# Patient Record
Sex: MALE | Race: WHITE | Employment: FULL TIME | URBAN - METROPOLITAN AREA
[De-identification: names, ages, dates, MRNs, and addresses within clinical notes are randomized per-mention and may not be internally consistent; named-entity substitution may affect disease eponyms.]

---

## 2017-03-30 PROBLEM — J30.1 SEASONAL ALLERGIC RHINITIS DUE TO POLLEN: Status: ACTIVE | Noted: 2017-03-30

## 2017-03-30 PROBLEM — IMO0001 ASYMMETRICAL SENSORINEURAL HEARING LOSS OF BOTH EARS: Status: ACTIVE | Noted: 2017-03-30

## 2017-03-30 PROBLEM — H93.13 BILATERAL TINNITUS: Status: ACTIVE | Noted: 2017-03-30

## 2018-12-18 ENCOUNTER — HOSPITAL ENCOUNTER (OUTPATIENT)
Age: 58
Discharge: HOME | End: 2018-12-18
Attending: FAMILY MEDICINE
Payer: COMMERCIAL

## 2018-12-18 VITALS
DIASTOLIC BLOOD PRESSURE: 68 MMHG | HEART RATE: 65 BPM | OXYGEN SATURATION: 100 % | SYSTOLIC BLOOD PRESSURE: 134 MMHG | HEIGHT: 69 IN | TEMPERATURE: 97 F | RESPIRATION RATE: 20 BRPM | BODY MASS INDEX: 24.44 KG/M2 | WEIGHT: 165 LBS

## 2018-12-18 DIAGNOSIS — H61.23 BILATERAL IMPACTED CERUMEN: Primary | ICD-10-CM

## 2018-12-18 PROCEDURE — 69209 REMOVE IMPACTED EAR WAX UNI: CPT

## 2018-12-18 PROCEDURE — 99213 OFFICE O/P EST LOW 20 MIN: CPT

## 2018-12-18 NOTE — ED PROVIDER NOTES
Patient Seen in: [facility]    History   Patient presents with:  Ear Problem Pain (neurosensory)  Headache (neurologic)    Stated Complaint: bilateral ears clogged/headache    HPI    41-year-old male presents for bilateral clogg oriented to person, place, and time. He appears well-developed and well-nourished. HENT:   Head: Normocephalic and atraumatic.    Right Ear: Hearing and external ear normal.   Left Ear: Hearing and external ear normal.   Nose: Nose normal.   Mouth/Throat:

## 2018-12-18 NOTE — ED INITIAL ASSESSMENT (HPI)
Patient presents with complains of \" clogged ears\" x 2 weeks with a intermittent headache x 2 days. Denies any fall or hitting his head. Denies any nausea, vomiting, or visual problems.

## 2019-11-11 ENCOUNTER — OFFICE VISIT (OUTPATIENT)
Dept: PODIATRY CLINIC | Facility: CLINIC | Age: 59
End: 2019-11-11
Payer: COMMERCIAL

## 2019-11-11 VITALS — DIASTOLIC BLOOD PRESSURE: 74 MMHG | SYSTOLIC BLOOD PRESSURE: 132 MMHG | HEART RATE: 65 BPM

## 2019-11-11 DIAGNOSIS — L60.0 ONYCHOCRYPTOSIS: Primary | ICD-10-CM

## 2019-11-11 DIAGNOSIS — M20.5X9 HALLUX LIMITUS, UNSPECIFIED LATERALITY: ICD-10-CM

## 2019-11-11 PROCEDURE — 99203 OFFICE O/P NEW LOW 30 MIN: CPT | Performed by: PODIATRIST

## 2019-11-11 PROCEDURE — 11750 EXCISION NAIL&NAIL MATRIX: CPT | Performed by: PODIATRIST

## 2019-11-11 RX ORDER — CEPHALEXIN 500 MG/1
500 CAPSULE ORAL 3 TIMES DAILY
Qty: 30 CAPSULE | Refills: 0 | Status: SHIPPED | OUTPATIENT
Start: 2019-11-11 | End: 2020-02-17 | Stop reason: ALTCHOICE

## 2019-11-12 NOTE — PATIENT INSTRUCTIONS
Ingrown Toenail (Excised)  An ingrown toenail occurs when the nail grows sideways into the skin next to the nail. This can cause pain and may lead to an infection with redness, swelling, and sometimes drainage.   The most common cause of an ingrown toenail nail bed to become dry and for the swelling to go down. If only the side of the nail was removed, it will begin to grow back in a few months.  To prevent recurrence, sometimes the side of the nail bed may be treated with a strong chemical to prevent the na that side of the nail to help it grow out straight. Follow-up care  Follow up as advised by your healthcare provider. If the ingrown toenail recurs, follow up with a foot specialist (podiatrist) for nail bed ablation.   When to seek medical care  Call your includes:  · Frequent warm water soaks  · Keeping the nail clean  · Wearing loose, comfortable shoes or open toe sandals  Another method to help the toe heal is to use a small piece of cotton or waxed dental floss to gently lift the corner of the problem n medicine to use. Note: Talk with your healthcare provider before using these medicines if you have chronic liver or kidney disease, have ever had a stomach ulcer or GI (gastrointestinal) bleeding, or are taking blood thinner medicines.   · If you were given

## 2019-11-12 NOTE — PROGRESS NOTES
Robyn Alicia is a 63year old male. Patient presents with:  New Patient: Left foot hallux, ingrown. 8/10, patient denies any discharge. Pain started last thursday.         HPI:     Patient presents today bothered by an ingrown toenail as well as pain unde file      Years of education: Not on file      Highest education level: Not on file    Tobacco Use      Smoking status: Never Smoker      Smokeless tobacco: Never Used    Substance and Sexual Activity      Alcohol use: No      Drug use: No    Other Topics the hallux. Today we decided to address the pain from the hallux. I discussed various treatment options decided to do an in office procedure for permanent removal of the nail.    Phenol and alcohol procedure this was discussed with the patient in great de

## 2019-11-12 NOTE — PROGRESS NOTES
Permanent removal of medial border of the left foot hallux with chemical destruction of nail root. Patient had left office before I could obtain post injection vitals.

## 2019-11-22 ENCOUNTER — HOSPITAL ENCOUNTER (OUTPATIENT)
Age: 59
Discharge: HOME | End: 2019-11-22
Payer: COMMERCIAL

## 2019-11-22 VITALS
TEMPERATURE: 98 F | DIASTOLIC BLOOD PRESSURE: 74 MMHG | HEART RATE: 61 BPM | WEIGHT: 165 LBS | RESPIRATION RATE: 16 BRPM | OXYGEN SATURATION: 100 % | BODY MASS INDEX: 24 KG/M2 | SYSTOLIC BLOOD PRESSURE: 111 MMHG

## 2019-11-22 DIAGNOSIS — H61.891 IRRITATION OF EXTERNAL EAR CANAL, RIGHT: ICD-10-CM

## 2019-11-22 DIAGNOSIS — H61.23 BILATERAL IMPACTED CERUMEN: Primary | ICD-10-CM

## 2019-11-22 PROCEDURE — 99213 OFFICE O/P EST LOW 20 MIN: CPT

## 2019-11-22 PROCEDURE — 99214 OFFICE O/P EST MOD 30 MIN: CPT

## 2019-11-22 PROCEDURE — 69209 REMOVE IMPACTED EAR WAX UNI: CPT

## 2019-11-22 RX ORDER — NEOMYCIN SULFATE, POLYMYXIN B SULFATE, HYDROCORTISONE 3.5; 10000; 1 MG/ML; [USP'U]/ML; MG/ML
3 SOLUTION/ DROPS AURICULAR (OTIC) 3 TIMES DAILY
Qty: 1 BOTTLE | Refills: 0 | Status: SHIPPED | OUTPATIENT
Start: 2019-11-22 | End: 2019-12-02

## 2019-11-22 NOTE — ED PROVIDER NOTES
Patient Seen in: [facility] In [address] [place] [address]      History   Patient presents with:  Ear Problem: clogged    Stated Complaint: BILATERAL EAR PAIN X 1 MONTH    HPI    70-year-old male here with complaint of bilateral ear occlusion for the past mon O2 Device None (Room air)       Current:/74   Pulse 61   Temp 98.2 °F (36.8 °C) (Temporal)   Resp 16   Wt 74.8 kg   SpO2 100%   BMI 24.37 kg/m²         Physical Exam  Vitals signs and nursing note reviewed.    Constitutional:       Appearance: He is impacted cerumen  (primary encounter diagnosis)  Irritation of external ear canal, right    Disposition:  Discharge  11/22/2019  5:44 pm    Follow-up:  Rosalina Garcia MD  [id number] [facility] [phone]          [facility]

## 2019-11-22 NOTE — ED INITIAL ASSESSMENT (HPI)
Pt. With ears feeling clogged for about 1 month. Lt. Is worse than Rt. No recent airplane travel. No recent swimming.

## 2019-11-25 ENCOUNTER — OFFICE VISIT (OUTPATIENT)
Dept: PODIATRY CLINIC | Facility: CLINIC | Age: 59
End: 2019-11-25
Payer: COMMERCIAL

## 2019-11-25 DIAGNOSIS — M20.5X9 HALLUX LIMITUS, UNSPECIFIED LATERALITY: ICD-10-CM

## 2019-11-25 DIAGNOSIS — L60.0 ONYCHOCRYPTOSIS: Primary | ICD-10-CM

## 2019-11-25 PROCEDURE — 99213 OFFICE O/P EST LOW 20 MIN: CPT | Performed by: PODIATRIST

## 2019-11-25 RX ORDER — SULFAMETHOXAZOLE AND TRIMETHOPRIM 800; 160 MG/1; MG/1
1 TABLET ORAL 2 TIMES DAILY
Qty: 14 TABLET | Refills: 0 | Status: SHIPPED | OUTPATIENT
Start: 2019-11-25 | End: 2020-02-17 | Stop reason: ALTCHOICE

## 2019-11-25 NOTE — PROGRESS NOTES
Cammie Bueno is a 63year old male. Patient presents with: Follow - Up: left hallux nail - nail is not doing good - pain scale 8/10 - finished antibiotics, still soaking  - denies taking any pain medication.         HPI:   Presents today for follow-up ev Mother    • Ear Problems Mother    • Allergies Mother    • Thyroid disease Mother    • Cancer Maternal Grandmother    • Cancer Maternal Grandfather    • Heart Disorder Paternal Grandfather    • Crohn's Disease Brother    • Cancer Father       Social Histor date    ASSESSMENT AND PLAN:   Diagnoses and all orders for this visit:    Onychocryptosis  -     Sulfamethoxazole-TMP -160 MG Oral Tab per tablet; Take 1 tablet by mouth 2 (two) times daily. Hallux limitus, unspecified laterality        Plan:  Lennox Shone

## 2019-12-16 ENCOUNTER — OFFICE VISIT (OUTPATIENT)
Dept: PODIATRY CLINIC | Facility: CLINIC | Age: 59
End: 2019-12-16
Payer: COMMERCIAL

## 2019-12-16 DIAGNOSIS — M20.5X9 HALLUX LIMITUS, UNSPECIFIED LATERALITY: ICD-10-CM

## 2019-12-16 DIAGNOSIS — L60.0 ONYCHOCRYPTOSIS: Primary | ICD-10-CM

## 2019-12-16 PROCEDURE — 99213 OFFICE O/P EST LOW 20 MIN: CPT | Performed by: PODIATRIST

## 2019-12-16 NOTE — PROGRESS NOTES
Ron Wei is a 63year old male. Patient presents with: Follow - Up: Left hallux P&A, patient completed second round of antibiotics. Patient states that the site feels better, but does notice some pain on the callus area.  Patient states that he does remember which side      Family History   Problem Relation Age of Onset   • Heart Disorder Mother    • Lipids Mother    • Thyroid Disorder Mother    • Fibromyalgia Mother    • Ear Problems Mother    • Allergies Mother    • Thyroid disease Mother    • Cance AND PLAN:   Diagnoses and all orders for this visit:    Onychocryptosis    Hallux limitus, unspecified laterality        Plan:  Today working to get him into a non-custom orthotic a [employer] was dispensed he did meet his deductible to have complete co

## 2020-02-17 ENCOUNTER — OFFICE VISIT (OUTPATIENT)
Dept: PODIATRY CLINIC | Facility: CLINIC | Age: 60
End: 2020-02-17
Payer: COMMERCIAL

## 2020-02-17 DIAGNOSIS — M20.5X9 HALLUX LIMITUS, UNSPECIFIED LATERALITY: ICD-10-CM

## 2020-02-17 DIAGNOSIS — L60.0 ONYCHOCRYPTOSIS: Primary | ICD-10-CM

## 2020-02-17 PROCEDURE — 99213 OFFICE O/P EST LOW 20 MIN: CPT | Performed by: PODIATRIST

## 2020-02-17 NOTE — PROGRESS NOTES
Lesa Navas is a 63year old male. Patient presents with: Follow - Up: Patient is here to review OTC orthotics. Patient would like to get another pair. Toenail Care: Left foot, hallux nail is growing out and site looks good from P&A.  Patient also has name: Not on file      Number of children: Not on file      Years of education: Not on file      Highest education level: Not on file    Tobacco Use      Smoking status: Never Smoker      Smokeless tobacco: Never Used    Substance and Sexual Activity

## 2023-08-29 ENCOUNTER — OFFICE VISIT (OUTPATIENT)
Dept: PODIATRY CLINIC | Facility: CLINIC | Age: 63
End: 2023-08-29

## 2023-08-29 DIAGNOSIS — M77.50 CAPSULITIS OF METATARSOPHALANGEAL (MTP) JOINT: ICD-10-CM

## 2023-08-29 DIAGNOSIS — M20.5X2 HALLUX LIMITUS OF LEFT FOOT: Primary | ICD-10-CM

## 2023-08-29 PROCEDURE — 99203 OFFICE O/P NEW LOW 30 MIN: CPT | Performed by: PODIATRIST

## 2023-10-30 ENCOUNTER — OFFICE VISIT (OUTPATIENT)
Dept: FAMILY MEDICINE CLINIC | Facility: CLINIC | Age: 63
End: 2023-10-30

## 2023-10-30 VITALS
SYSTOLIC BLOOD PRESSURE: 122 MMHG | OXYGEN SATURATION: 99 % | HEIGHT: 68.5 IN | BODY MASS INDEX: 24.12 KG/M2 | TEMPERATURE: 98 F | DIASTOLIC BLOOD PRESSURE: 72 MMHG | RESPIRATION RATE: 16 BRPM | HEART RATE: 72 BPM | WEIGHT: 161 LBS

## 2023-10-30 DIAGNOSIS — H61.23 BILATERAL IMPACTED CERUMEN: Primary | ICD-10-CM

## 2023-10-30 PROCEDURE — 3078F DIAST BP <80 MM HG: CPT | Performed by: PHYSICIAN ASSISTANT

## 2023-10-30 PROCEDURE — 99203 OFFICE O/P NEW LOW 30 MIN: CPT | Performed by: PHYSICIAN ASSISTANT

## 2023-10-30 PROCEDURE — 3008F BODY MASS INDEX DOCD: CPT | Performed by: PHYSICIAN ASSISTANT

## 2023-10-30 PROCEDURE — 3074F SYST BP LT 130 MM HG: CPT | Performed by: PHYSICIAN ASSISTANT

## 2025-01-19 ENCOUNTER — OFFICE VISIT (OUTPATIENT)
Dept: FAMILY MEDICINE CLINIC | Facility: CLINIC | Age: 65
End: 2025-01-19
Payer: COMMERCIAL

## 2025-01-19 VITALS
BODY MASS INDEX: 24.25 KG/M2 | SYSTOLIC BLOOD PRESSURE: 128 MMHG | WEIGHT: 160 LBS | DIASTOLIC BLOOD PRESSURE: 72 MMHG | RESPIRATION RATE: 18 BRPM | HEIGHT: 68 IN | TEMPERATURE: 99 F | HEART RATE: 83 BPM | OXYGEN SATURATION: 98 %

## 2025-01-19 DIAGNOSIS — H61.23 BILATERAL IMPACTED CERUMEN: Primary | ICD-10-CM

## 2025-01-19 PROCEDURE — 3078F DIAST BP <80 MM HG: CPT | Performed by: NURSE PRACTITIONER

## 2025-01-19 PROCEDURE — 3008F BODY MASS INDEX DOCD: CPT | Performed by: NURSE PRACTITIONER

## 2025-01-19 PROCEDURE — 69210 REMOVE IMPACTED EAR WAX UNI: CPT | Performed by: NURSE PRACTITIONER

## 2025-01-19 PROCEDURE — 3074F SYST BP LT 130 MM HG: CPT | Performed by: NURSE PRACTITIONER

## 2025-01-19 NOTE — PROGRESS NOTES
CHIEF COMPLAINT:     Chief Complaint   Patient presents with    Ear Problem     Clogged ears, pt is had a spell of dizziness, headache        HPI:   Obinna Castillo is a 64 year old male who presents with complaints of clogged ears.  Has personal history of wax build up. Last had ears irrigated in 2023.  Reports decreased hearing for awhile.  The reason he decided to come in was last night he felt off balance for a few hours.  Went to sleep and symptom was resolved when he woke up.  Assumed it was due to his ears or his sinuses.  Has hx of environmental allergies and cluster headaches.  Otherwise, feels well today.  Denies fever, headache, ear pain, ear drainage.    Treatments tried:  debrox 1 day per week since last irrigation for prevention.       Current Outpatient Medications   Medication Sig Dispense Refill    Timolol Maleate 0.5 % Ophthalmic Solution Place 1 drop into both eyes daily.        Past Medical History:    Cataract    as a child and as adult    Cluster headache    High cholesterol    IBS (irritable bowel syndrome)    Migraines    cluster ha     Visual impairment      Social History:  Social History     Socioeconomic History    Marital status:    Tobacco Use    Smoking status: Never    Smokeless tobacco: Never   Vaping Use    Vaping status: Never Used   Substance and Sexual Activity    Alcohol use: No    Drug use: No        REVIEW OF SYSTEMS:   GENERAL: Denies fever, chills,weight change, decreased appetite  SKIN: Denies rashes, skin wounds or ulcers.  EYES: Denies blurred vision or double vision  HENT: Denies congestion, rhinorrhea, sore throat or ear pain  CHEST: Denies chest pain, or palpitations  LUNGS: Denies shortness of breath, cough, or wheezing  GI: Denies abdominal pain, N/V/C/D.   MUSCULOSKELETAL: no arthralgia or swollen joints  LYMPH:  Denies lymphadenopathy  NEURO: Denies headaches or lightheadedness      EXAM:   /72 (BP Location: Right arm, Patient Position: Sitting, Cuff  Size: adult)   Pulse 83   Temp 99.2 °F (37.3 °C) (Tympanic)   Resp 18   Ht 5' 8\" (1.727 m)   Wt 160 lb (72.6 kg)   SpO2 98%   BMI 24.33 kg/m²     GENERAL: well developed, well nourished,in no apparent distress  SKIN: no rashes,no suspicious lesions  HEAD: atraumatic, normocephalic. No sinus tenderness.  EYES: conjunctiva clear, EOM intact, PERRL  EARS: TMs occluded by cerumen.  NOSE: nostrils patent, no exudates, nasal mucosa pink and noninflamed  THROAT: oral mucosa pink, moist. Posterior pharynx is not erythematous. no exudates.  NECK: supple, non-tender  LUNGS: clear to auscultation bilaterally, no wheezes or rhonchi. Breathing is non labored.  CARDIO: RRR without murmur  LYMPH:  No pre/post auricular or cervical lymphadenopathy.    NEURO:  No focal deficits.    Cerumen Removal Procedure  Patient gave verbal consent.    Risks and Benefits of removal were discussed with the patient.   he agreed to proceed with procedure.    Location:  both ears   Indication:  TM not visible, local itching, fullness.  Prep:  Hydrogen Peroxide with warm water via ear elephant.  Removal: Otoscope visualization of cerumen and the curette was used to remove the wax  Patient Status: Tolerated Well; No complications    EARS: TMs clear, no bulging, no retraction, no fluid, bony landmarks present.  Patient reports able to hear better.    ASSESSMENT AND PLAN:   Obinna Castillo is a 64 year old male who presents with:    ASSESSMENT:  Encounter Diagnosis   Name Primary?    Bilateral impacted cerumen Yes       PLAN:  May use Debrox or 1/2 strength hydrogen peroxide (50:50 with water) twice a day for a week to prevent ear wax buildup every few months.  Seek medical attention for ear wax removal if pain/discomfort or decreased hearing.  Avoid using Q-tips, as this can push the wax further into ear.   Clean wax from external ear with the tip of washcloth in the shower.      There are no Patient Instructions on file for this visit.

## (undated) NOTE — LETTER
AUTHORIZATION FOR SURGICAL OPERATION OR OTHER PROCEDURE    1.  I hereby authorize Dr. Jules Brizuela,  [facility] to perform the following operation and/or procedure at the [facility]:    _______________________________ Time:  ________ A. M.  P.M.        Patient Name:  ______________________________________________________  (please print)      Patient signature:  ___________________________________________________             Relationship to Patient: